# Patient Record
Sex: FEMALE | Race: WHITE | Employment: OTHER | URBAN - METROPOLITAN AREA
[De-identification: names, ages, dates, MRNs, and addresses within clinical notes are randomized per-mention and may not be internally consistent; named-entity substitution may affect disease eponyms.]

---

## 2017-01-03 ENCOUNTER — OP HISTORICAL/CONVERTED ENCOUNTER (OUTPATIENT)
Dept: OTHER | Age: 67
End: 2017-01-03

## 2017-04-13 ENCOUNTER — OP HISTORICAL/CONVERTED ENCOUNTER (OUTPATIENT)
Dept: OTHER | Age: 67
End: 2017-04-13

## 2017-10-02 ENCOUNTER — OP HISTORICAL/CONVERTED ENCOUNTER (OUTPATIENT)
Dept: OTHER | Age: 67
End: 2017-10-02

## 2017-11-17 ENCOUNTER — OP HISTORICAL/CONVERTED ENCOUNTER (OUTPATIENT)
Dept: OTHER | Age: 67
End: 2017-11-17

## 2017-12-08 ENCOUNTER — OP HISTORICAL/CONVERTED ENCOUNTER (OUTPATIENT)
Dept: OTHER | Age: 67
End: 2017-12-08

## 2018-05-21 ENCOUNTER — OP HISTORICAL/CONVERTED ENCOUNTER (OUTPATIENT)
Dept: OTHER | Age: 68
End: 2018-05-21

## 2018-09-24 ENCOUNTER — OP HISTORICAL/CONVERTED ENCOUNTER (OUTPATIENT)
Dept: OTHER | Age: 68
End: 2018-09-24

## 2018-11-26 ENCOUNTER — OP HISTORICAL/CONVERTED ENCOUNTER (OUTPATIENT)
Dept: OTHER | Age: 68
End: 2018-11-26

## 2019-12-02 ENCOUNTER — OP HISTORICAL/CONVERTED ENCOUNTER (OUTPATIENT)
Dept: OTHER | Age: 69
End: 2019-12-02

## 2021-03-23 ENCOUNTER — TRANSCRIBE ORDER (OUTPATIENT)
Dept: SCHEDULING | Age: 71
End: 2021-03-23

## 2021-03-23 DIAGNOSIS — Z12.31 SCREENING MAMMOGRAM FOR HIGH-RISK PATIENT: Primary | ICD-10-CM

## 2021-04-23 ENCOUNTER — HOSPITAL ENCOUNTER (OUTPATIENT)
Dept: MAMMOGRAPHY | Age: 71
Discharge: HOME | End: 2021-04-23
Payer: MEDICARE

## 2021-04-23 DIAGNOSIS — Z12.31 SCREENING MAMMOGRAM FOR HIGH-RISK PATIENT: ICD-10-CM

## 2021-04-23 PROCEDURE — 77063 BREAST TOMOSYNTHESIS BI: CPT

## 2022-06-13 ENCOUNTER — TRANSCRIBE ORDER (OUTPATIENT)
Dept: SCHEDULING | Age: 72
End: 2022-06-13

## 2022-06-13 DIAGNOSIS — Z12.31 BREAST CANCER SCREENING BY MAMMOGRAM: Primary | ICD-10-CM

## 2022-08-15 ENCOUNTER — HOSPITAL ENCOUNTER (OUTPATIENT)
Dept: MAMMOGRAPHY | Age: 72
Discharge: HOME | End: 2022-08-15
Attending: INTERNAL MEDICINE
Payer: MEDICARE

## 2022-08-15 DIAGNOSIS — Z12.31 BREAST CANCER SCREENING BY MAMMOGRAM: ICD-10-CM

## 2022-08-15 PROCEDURE — 77063 BREAST TOMOSYNTHESIS BI: CPT

## 2022-10-25 RX ORDER — LOSARTAN POTASSIUM 100 MG/1
TABLET ORAL
Qty: 90 TABLET | Refills: 0 | Status: SHIPPED | OUTPATIENT
Start: 2022-10-25

## 2022-11-15 LAB
CREATININE, EXTERNAL: 1.02
HBA1C MFR BLD HPLC: 6.3 %
LDL-C, EXTERNAL: 106
TOTAL CHOLESTEROL, NCHOLT: 182

## 2022-11-28 RX ORDER — AZELASTINE HCL 205.5 UG/1
1 SPRAY NASAL 2 TIMES DAILY
COMMUNITY

## 2022-11-28 RX ORDER — SIMVASTATIN 20 MG/1
20 TABLET, FILM COATED ORAL DAILY
COMMUNITY

## 2022-11-28 RX ORDER — ASPIRIN 81 MG/1
81 TABLET ORAL DAILY
COMMUNITY

## 2022-11-28 RX ORDER — METOPROLOL SUCCINATE 50 MG/1
50 TABLET, EXTENDED RELEASE ORAL
COMMUNITY

## 2022-11-28 RX ORDER — AMLODIPINE BESYLATE 5 MG/1
5 TABLET ORAL DAILY
COMMUNITY

## 2022-11-28 RX ORDER — TORSEMIDE 5 MG/1
5 TABLET ORAL DAILY
COMMUNITY
Start: 2022-03-14

## 2022-12-22 PROBLEM — I10 ESSENTIAL HYPERTENSION, BENIGN: Status: ACTIVE | Noted: 2022-12-22

## 2022-12-22 PROBLEM — F41.9 MILD ANXIETY: Status: ACTIVE | Noted: 2022-12-22

## 2022-12-22 RX ORDER — AZITHROMYCIN 250 MG/1
250 TABLET, FILM COATED ORAL
COMMUNITY
Start: 2022-11-29 | End: 2022-12-23

## 2022-12-23 ENCOUNTER — OFFICE VISIT (OUTPATIENT)
Dept: INTERNAL MEDICINE CLINIC | Age: 72
End: 2022-12-23
Payer: MEDICARE

## 2022-12-23 VITALS
BODY MASS INDEX: 30.9 KG/M2 | HEART RATE: 65 BPM | HEIGHT: 64 IN | WEIGHT: 181 LBS | SYSTOLIC BLOOD PRESSURE: 132 MMHG | DIASTOLIC BLOOD PRESSURE: 82 MMHG | OXYGEN SATURATION: 98 % | TEMPERATURE: 97.3 F
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
## 2022-12-23 DIAGNOSIS — E78.00 PURE HYPERCHOLESTEROLEMIA: Primary | ICD-10-CM

## 2022-12-23 DIAGNOSIS — Z11.59 ENCOUNTER FOR HEPATITIS C SCREENING TEST FOR LOW RISK PATIENT: ICD-10-CM

## 2022-12-23 DIAGNOSIS — R79.89 ELEVATED SERUM CREATININE: ICD-10-CM

## 2022-12-23 DIAGNOSIS — I10 ESSENTIAL HYPERTENSION, BENIGN: ICD-10-CM

## 2022-12-23 DIAGNOSIS — M25.511 PAIN, JOINT, SHOULDER, RIGHT: ICD-10-CM

## 2022-12-23 DIAGNOSIS — D75.1 ERYTHROCYTOSIS: ICD-10-CM

## 2022-12-23 DIAGNOSIS — T14.8XXA ABRASION: ICD-10-CM

## 2022-12-23 DIAGNOSIS — R73.01 ELEVATED FASTING GLUCOSE: ICD-10-CM

## 2022-12-23 PROBLEM — D72.10 EOSINOPHILIA, UNSPECIFIED TYPE: Status: ACTIVE | Noted: 2022-12-23

## 2022-12-23 PROBLEM — M25.471 ANKLE EDEMA, BILATERAL: Status: ACTIVE | Noted: 2022-12-23

## 2022-12-23 PROBLEM — E87.0 HYPERNATREMIA: Status: ACTIVE | Noted: 2022-12-23

## 2022-12-23 PROBLEM — Z86.79 HISTORY OF CAROTID ATHEROSCLEROSIS: Status: ACTIVE | Noted: 2022-12-23

## 2022-12-23 PROBLEM — M25.472 ANKLE EDEMA, BILATERAL: Status: ACTIVE | Noted: 2022-12-23

## 2022-12-23 PROBLEM — M50.30 DDD (DEGENERATIVE DISC DISEASE), CERVICAL: Status: ACTIVE | Noted: 2022-12-23

## 2022-12-23 RX ORDER — ROSUVASTATIN CALCIUM 5 MG/1
5 TABLET, COATED ORAL
Qty: 90 TABLET | Refills: 1 | Status: SHIPPED | OUTPATIENT
Start: 2022-12-23

## 2022-12-23 RX ORDER — TORSEMIDE 5 MG/1
5 TABLET ORAL DAILY
Qty: 90 TABLET | Refills: 1 | Status: SHIPPED | OUTPATIENT
Start: 2022-12-23 | End: 2022-12-23 | Stop reason: SDUPTHER

## 2022-12-23 RX ORDER — TORSEMIDE 5 MG/1
5 TABLET ORAL DAILY
Qty: 90 TABLET | Refills: 1 | Status: SHIPPED | OUTPATIENT
Start: 2022-12-23

## 2022-12-23 RX ORDER — MULTIVIT WITH MINERALS/HERBS
1 TABLET ORAL DAILY
COMMUNITY

## 2022-12-23 NOTE — PROGRESS NOTES
[address] [facility]  [address]  [place], [address] [facility]  Phone: [phone]      Cathleen Kaminski (: 1950) is a 74 y.o. female, established patient, here for evaluation of the following chief complaint(s):  Labs, Fall, and Follow Up Chronic Condition         SUBJECTIVE/OBJECTIVE:  HPI:  Patient is here for follow up, she recently had blood work. She states that she cut back her Simvastatin to 3 xs weekly. She had a dental visit for abscess. She had two falls recently. The first fall she was at home, she did not know she fell, she states that she lost her balance. The 2nd fall she was at [facility] and she lost her balance over a wheel chair and she skinned her left knee. She did not  hit her head. She did not go to the ER. She is still having right shoulder pain, she states that it comes and goes. She states that the pain is worse at bedtime, she cannot sleep on that side. She has started taking B vitamins to help vitamin deficiency. She needs a refill on Torsemide. She has not had COVID booster and she had her annual Flu shot for . 11/15/2022 RBC 5.3, Hemo 15.8, Hema 48.3, Glu 112, Creat 1.02, GFR 58. K+ 4.7, Chol T 182, Trig 176, HDL 45, , A1c 6.3, TSH 1.68, Mag 2.3. Prior to Admission medications    Medication Sig Start Date End Date Taking? Authorizing Provider   b complex vitamins tablet Take 1 Tablet by mouth daily. Yes Provider, Historical   torsemide (DEMADEX) 5 mg tablet Take 1 Tablet by mouth daily. 22  Yes Eleanor De Anda MD   rosuvastatin (CRESTOR) 5 mg tablet Take 1 Tablet by mouth nightly. 22  Yes Eleanor De Anda MD   amLODIPine (NORVASC) 5 mg tablet Take 5 mg by mouth daily. Yes Provider, Historical   metoprolol succinate (TOPROL-XL) 50 mg XL tablet Take 50 mg by mouth nightly. Yes Provider, Historical   azelastine (ASTEPRO) 205.5 mcg (0.15 %) 1 Basile by Both Nostrils route two (2) times a day.    Yes Provider, Historical   aspirin delayed-release 81 mg tablet Take 81 mg by mouth daily. Yes Provider, Historical   losartan (COZAAR) 100 mg tablet TAKE 1 TABLET BY MOUTH IN THE MORNING 10/25/22  Yes Sabine Reynoso MD        No Known Allergies     Past Medical History:   Diagnosis Date    Abnormal LFTs     Ankle edema, bilateral     DDD (degenerative disc disease), cervical     Eosinophilia, unspecified type     Essential hypertension, benign     History of carotid atherosclerosis     Hypernatremia     Mild anxiety     Pain, joint, shoulder, right     Pure hypercholesterolemia         Family History   Problem Relation Age of Onset    Ovarian Cancer Mother     Arthritis Mother     Cancer Father     Emphysema Father     Stroke Brother     Cancer Maternal Grandmother     Heart Attack Maternal Grandfather         Past Surgical History:   Procedure Laterality Date    HX CATARACT REMOVAL  03/20/2014    Dr. Manuel Bars ORTHOPAEDIC  02/08/2013    foot surgery/ Dr Ara Jefferson      Carotid Endarectomy 2002 and 2003 Bilateral       Review of Systems   Constitutional:  Negative for chills and fever. HENT:  Negative for congestion, ear pain, nosebleeds, sinus pain, sore throat and tinnitus. Eyes:  Negative for redness. Respiratory:  Negative for cough and shortness of breath. Cardiovascular:  Negative for chest pain and palpitations. Gastrointestinal:  Negative for abdominal pain, diarrhea, nausea and vomiting. Endocrine: Negative for cold intolerance and polyuria. Genitourinary:  Negative for dysuria and hematuria. Musculoskeletal:  Negative for back pain and neck pain. Right shoulder pain, fall and left knee abrasion   Skin:  Negative for rash. Neurological:  Negative for dizziness and headaches. Psychiatric/Behavioral: Negative.        /82 (BP 1 Location: Left upper arm, BP Patient Position: Sitting)   Pulse 65   Temp 97.3 °F (36.3 °C) (Temporal)   Ht 5' 4\" (1.626 m)   Wt 181 lb (82.1 kg)   SpO2 98%   BMI 31.07 kg/m²      Physical Exam  Constitutional:       General: She is not in acute distress. Appearance: Normal appearance. HENT:      Head: Normocephalic and atraumatic. Right Ear: External ear normal.      Left Ear: External ear normal.      Nose: Nose normal.      Mouth/Throat:      Mouth: Mucous membranes are moist.   Eyes:      Extraocular Movements: Extraocular movements intact. Pupils: Pupils are equal, round, and reactive to light. Cardiovascular:      Rate and Rhythm: Normal rate and regular rhythm. Pulmonary:      Effort: Pulmonary effort is normal.      Breath sounds: Normal breath sounds. Abdominal:      General: Bowel sounds are normal.      Palpations: Abdomen is soft. Tenderness: There is no abdominal tenderness. Musculoskeletal:         General: Normal range of motion. Cervical back: Neck supple. Comments: Healing abrasion left knee, crepitus both knees, range of movement of right shoulder limited. Skin:     General: Skin is warm and dry. Neurological:      General: No focal deficit present. Mental Status: She is alert and oriented to person, place, and time. Psychiatric:         Mood and Affect: Mood normal.       ASSESSMENT/PLAN:  Below is the assessment and plan developed based on review of pertinent history, physical exam, labs, studies, and medications. 1. Pure hypercholesterolemia  Comments:  Labs reviewed with the patient, total cholesterol 182, triglyceride 176, HDL 45, , advised low-fat diet, will change simvastatin to rosuvastatin. Orders:  -     METABOLIC PANEL, COMPREHENSIVE; Future  -     LIPID PANEL; Future  2. Pain, joint, shoulder, right  Comments:  Probably rotator rotator cuff pathology will check MRI of the shoulder  Orders:  -     MRI SHOULDER RT WO CONT; Future  3.  Essential hypertension, benign  Comments:  Blood pressure is fairly controlled, advised low-sodium diet and to continue metoprolol and amlodipine. Advised to monitor blood pressure at home. Orders:  -     METABOLIC PANEL, COMPREHENSIVE; Future  4. Fall, initial encounter  Comments:  Fall precautions advised  5. Elevated fasting glucose  Comments:  Glucose is slightly high at 112 and A1c 6.3, advised low-carb diet  Orders:  -     HEMOGLOBIN A1C WITH EAG; Future  6. Elevated serum creatinine  Comments:  Creatinine is slightly high at 1.02, advised to increase water intake to avoid Aleve, Advil, naproxen. 7. Mixed hyperlipidemia. To continue low-fat diet and Crestor has been started. 8. Abrasion. Left knee, status post fall, advised to continue topical antibiotics  9. Encounter for hepatitis C screening test for low risk patient  -     HEPATITIS C AB; Future  10. Erythrocytosis  Comments:   RBC count is 5.35, patient has snoring most probably will erythrocytosis is due to sleep apnea patient prefers to wait for further work-up for sleep apnea    Return in about 5 months (around 5/23/2023). There are no Patient Instructions on file for this visit. Health Maintenance Due   Topic Date Due    Hepatitis C Screening  Never done    Colorectal Cancer Screening Combo  Never done    Shingles Vaccine (1 of 2) Never done    Bone Densitometry (Dexa) Screening  Never done    DTaP/Tdap/Td series (1 - Tdap) 10/09/2019    Pneumococcal 65+ years (2 - PPSV23 if available, else PCV20) 10/23/2020    Medicare Yearly Exam  Never done    COVID-19 Vaccine (3 - Booster for Shakila Most series) 08/19/2021        Aspects of this note may have been generated using voice recognition software. Despite editing, there may be unrecognized errors. An electronic signature was used to authenticate this note.   -- Pricila Mercado MD

## 2022-12-23 NOTE — PROGRESS NOTES
Chief Complaint   Patient presents with    Labs    Fall    Follow Up Chronic Condition     1. Have you been to the ER, urgent care clinic since your last visit? Hospitalized since your last visit? No    2. Have you seen or consulted any other health care providers outside of the [address] [place] [address] since your last visit? Include any pap smears or colon screening.  No

## 2023-01-20 RX ORDER — LOSARTAN POTASSIUM 100 MG/1
TABLET ORAL
Qty: 90 TABLET | Refills: 0 | Status: SHIPPED | OUTPATIENT
Start: 2023-01-20

## 2023-02-16 RX ORDER — AMLODIPINE BESYLATE 5 MG/1
5 TABLET ORAL DAILY
Qty: 90 TABLET | Refills: 1 | Status: SHIPPED | OUTPATIENT
Start: 2023-02-16

## 2023-04-24 RX ORDER — LOSARTAN POTASSIUM 100 MG/1
TABLET ORAL
Qty: 90 TABLET | Refills: 0 | Status: SHIPPED | OUTPATIENT
Start: 2023-04-24

## 2025-03-17 ENCOUNTER — TELEPHONE (OUTPATIENT)
Facility: CLINIC | Age: 75
End: 2025-03-17

## 2025-03-17 NOTE — TELEPHONE ENCOUNTER
----- Message from Heather THOMPSON sent at 3/17/2025  2:17 PM EDT -----  Regarding: [facility] Appointment Request  [facility] Appointment Request    Patient needs appointment for [facility] Appointment Type: New to Provider.    Patient Requested Dates(s):  Any day next weeek  Patient Requested Time: Anytime after 11 am  Provider Name: Kristina Vu MD    Reason for Appointment Request: New Patient - No appointments available during search  --------------------------------------------------------------------------------------------------------------------------    Relationship to Patient: Self     Call Back Information: OK to leave message on voicemail  Preferred Call Back Number: Phone [phone]

## 2025-03-18 NOTE — TELEPHONE ENCOUNTER
Spoke with the patient and offered her a new patient appointment on 9/18/2025, which was a little longer than she wanted to wait. Patient has been calling around trying to find something close to home that she doesn't have to drive very far. Patient stated that [place] is a little too far and [facility] she went on the waiting list. Patient asked to think about it before booking an appointment in September and would give us a call back.